# Patient Record
Sex: MALE | Race: WHITE | NOT HISPANIC OR LATINO
[De-identification: names, ages, dates, MRNs, and addresses within clinical notes are randomized per-mention and may not be internally consistent; named-entity substitution may affect disease eponyms.]

---

## 2017-08-14 ENCOUNTER — APPOINTMENT (OUTPATIENT)
Dept: OTHER | Facility: CLINIC | Age: 52
End: 2017-08-14
Payer: COMMERCIAL

## 2017-08-14 VITALS
SYSTOLIC BLOOD PRESSURE: 121 MMHG | HEART RATE: 54 BPM | DIASTOLIC BLOOD PRESSURE: 69 MMHG | WEIGHT: 213 LBS | BODY MASS INDEX: 30.49 KG/M2 | OXYGEN SATURATION: 97 % | HEIGHT: 70 IN

## 2017-08-14 PROCEDURE — 94010 BREATHING CAPACITY TEST: CPT

## 2017-08-14 PROCEDURE — 99396 PREV VISIT EST AGE 40-64: CPT

## 2017-08-14 PROCEDURE — 96150: CPT

## 2017-08-18 LAB
ALBUMIN SERPL ELPH-MCNC: 4.4 G/DL
ALP BLD-CCNC: 54 U/L
ALT SERPL-CCNC: 24 U/L
ANION GAP SERPL CALC-SCNC: 16 MMOL/L
APPEARANCE: ABNORMAL
AST SERPL-CCNC: 28 U/L
BASOPHILS # BLD AUTO: 0.01 K/UL
BASOPHILS NFR BLD AUTO: 0.2 %
BILIRUB SERPL-MCNC: 0.7 MG/DL
BILIRUBIN URINE: NEGATIVE
BLOOD URINE: NEGATIVE
BUN SERPL-MCNC: 17 MG/DL
CALCIUM SERPL-MCNC: 8.8 MG/DL
CHLORIDE SERPL-SCNC: 99 MMOL/L
CHOLEST SERPL-MCNC: 242 MG/DL
CHOLEST/HDLC SERPL: 3.2 RATIO
CO2 SERPL-SCNC: 25 MMOL/L
COLOR: ABNORMAL
CREAT SERPL-MCNC: 1.14 MG/DL
EOSINOPHIL # BLD AUTO: 0.02 K/UL
EOSINOPHIL NFR BLD AUTO: 0.4 %
GLUCOSE QUALITATIVE U: NORMAL MG/DL
GLUCOSE SERPL-MCNC: 94 MG/DL
HCT VFR BLD CALC: 43.4 %
HDLC SERPL-MCNC: 76 MG/DL
HGB BLD-MCNC: 14.6 G/DL
IMM GRANULOCYTES NFR BLD AUTO: 0.4 %
KETONES URINE: ABNORMAL
LDLC SERPL CALC-MCNC: 152 MG/DL
LEUKOCYTE ESTERASE URINE: NEGATIVE
LYMPHOCYTES # BLD AUTO: 1.56 K/UL
LYMPHOCYTES NFR BLD AUTO: 28.3 %
MAN DIFF?: NORMAL
MCHC RBC-ENTMCNC: 31.5 PG
MCHC RBC-ENTMCNC: 33.6 GM/DL
MCV RBC AUTO: 93.7 FL
MONOCYTES # BLD AUTO: 0.54 K/UL
MONOCYTES NFR BLD AUTO: 9.8 %
NEUTROPHILS # BLD AUTO: 3.37 K/UL
NEUTROPHILS NFR BLD AUTO: 60.9 %
NITRITE URINE: NEGATIVE
PH URINE: 6
PLATELET # BLD AUTO: 219 K/UL
POTASSIUM SERPL-SCNC: 4.5 MMOL/L
PROT SERPL-MCNC: 7.7 G/DL
PROTEIN URINE: NEGATIVE MG/DL
RBC # BLD: 4.63 M/UL
RBC # FLD: 12.3 %
SODIUM SERPL-SCNC: 140 MMOL/L
SPECIFIC GRAVITY URINE: 1.03
TRIGL SERPL-MCNC: 68 MG/DL
UROBILINOGEN URINE: 1 MG/DL
WBC # FLD AUTO: 5.52 K/UL

## 2018-10-11 ENCOUNTER — APPOINTMENT (OUTPATIENT)
Dept: OTHER | Facility: CLINIC | Age: 53
End: 2018-10-11
Payer: COMMERCIAL

## 2018-10-11 VITALS
DIASTOLIC BLOOD PRESSURE: 80 MMHG | WEIGHT: 208 LBS | HEIGHT: 70 IN | OXYGEN SATURATION: 98 % | SYSTOLIC BLOOD PRESSURE: 126 MMHG | HEART RATE: 50 BPM | BODY MASS INDEX: 29.78 KG/M2 | RESPIRATION RATE: 16 BRPM

## 2018-10-11 DIAGNOSIS — Z80.1 FAMILY HISTORY OF MALIGNANT NEOPLASM OF TRACHEA, BRONCHUS AND LUNG: ICD-10-CM

## 2018-10-11 DIAGNOSIS — Z82.49 FAMILY HISTORY OF ISCHEMIC HEART DISEASE AND OTHER DISEASES OF THE CIRCULATORY SYSTEM: ICD-10-CM

## 2018-10-11 PROCEDURE — 96150: CPT

## 2018-10-11 PROCEDURE — 99396 PREV VISIT EST AGE 40-64: CPT

## 2018-10-11 PROCEDURE — 94010 BREATHING CAPACITY TEST: CPT

## 2018-10-12 LAB
ALBUMIN SERPL ELPH-MCNC: 4.4 G/DL
ALP BLD-CCNC: 57 U/L
ALT SERPL-CCNC: 30 U/L
ANION GAP SERPL CALC-SCNC: 10 MMOL/L
APPEARANCE: CLEAR
AST SERPL-CCNC: 30 U/L
BACTERIA: NEGATIVE
BASOPHILS # BLD AUTO: 0.01 K/UL
BASOPHILS NFR BLD AUTO: 0.2 %
BILIRUB SERPL-MCNC: 0.6 MG/DL
BILIRUBIN URINE: NEGATIVE
BLOOD URINE: NEGATIVE
BUN SERPL-MCNC: 14 MG/DL
CALCIUM SERPL-MCNC: 9.2 MG/DL
CHLORIDE SERPL-SCNC: 102 MMOL/L
CHOLEST SERPL-MCNC: 207 MG/DL
CHOLEST/HDLC SERPL: 2.9 RATIO
CO2 SERPL-SCNC: 26 MMOL/L
COLOR: YELLOW
CREAT SERPL-MCNC: 1.15 MG/DL
EOSINOPHIL # BLD AUTO: 0.03 K/UL
EOSINOPHIL NFR BLD AUTO: 0.7 %
GLUCOSE QUALITATIVE U: NEGATIVE MG/DL
GLUCOSE SERPL-MCNC: 87 MG/DL
HCT VFR BLD CALC: 42.9 %
HDLC SERPL-MCNC: 71 MG/DL
HGB BLD-MCNC: 13.9 G/DL
IMM GRANULOCYTES NFR BLD AUTO: 0.2 %
KETONES URINE: NEGATIVE
LDLC SERPL CALC-MCNC: 117 MG/DL
LEUKOCYTE ESTERASE URINE: NEGATIVE
LYMPHOCYTES # BLD AUTO: 1.49 K/UL
LYMPHOCYTES NFR BLD AUTO: 32.5 %
MAN DIFF?: NORMAL
MCHC RBC-ENTMCNC: 31.9 PG
MCHC RBC-ENTMCNC: 32.4 GM/DL
MCV RBC AUTO: 98.4 FL
MICROSCOPIC-UA: NORMAL
MONOCYTES # BLD AUTO: 0.48 K/UL
MONOCYTES NFR BLD AUTO: 10.5 %
NEUTROPHILS # BLD AUTO: 2.56 K/UL
NEUTROPHILS NFR BLD AUTO: 55.9 %
NITRITE URINE: NEGATIVE
PH URINE: 5.5
PLATELET # BLD AUTO: 222 K/UL
POTASSIUM SERPL-SCNC: 4.7 MMOL/L
PROT SERPL-MCNC: 7 G/DL
PROTEIN URINE: NEGATIVE MG/DL
RBC # BLD: 4.36 M/UL
RBC # FLD: 12.6 %
RED BLOOD CELLS URINE: 1 /HPF
SODIUM SERPL-SCNC: 138 MMOL/L
SPECIFIC GRAVITY URINE: 1.02
SQUAMOUS EPITHELIAL CELLS: 0 /HPF
TRIGL SERPL-MCNC: 94 MG/DL
UROBILINOGEN URINE: NEGATIVE MG/DL
WBC # FLD AUTO: 4.58 K/UL
WHITE BLOOD CELLS URINE: 0 /HPF

## 2020-09-14 ENCOUNTER — OUTPATIENT (OUTPATIENT)
Dept: OUTPATIENT SERVICES | Facility: HOSPITAL | Age: 55
LOS: 1 days | End: 2020-09-14
Payer: COMMERCIAL

## 2020-09-14 ENCOUNTER — APPOINTMENT (OUTPATIENT)
Dept: OTHER | Facility: CLINIC | Age: 55
End: 2020-09-14
Payer: COMMERCIAL

## 2020-09-14 ENCOUNTER — RESULT REVIEW (OUTPATIENT)
Age: 55
End: 2020-09-14

## 2020-09-14 VITALS
DIASTOLIC BLOOD PRESSURE: 78 MMHG | RESPIRATION RATE: 16 BRPM | OXYGEN SATURATION: 99 % | SYSTOLIC BLOOD PRESSURE: 119 MMHG | HEART RATE: 59 BPM | HEIGHT: 70 IN | WEIGHT: 212 LBS | TEMPERATURE: 97.5 F | BODY MASS INDEX: 30.35 KG/M2

## 2020-09-14 DIAGNOSIS — Z04.9 ENCOUNTER FOR EXAMINATION AND OBSERVATION FOR UNSPECIFIED REASON: ICD-10-CM

## 2020-09-14 DIAGNOSIS — Z23 ENCOUNTER FOR IMMUNIZATION: ICD-10-CM

## 2020-09-14 PROCEDURE — 99396 PREV VISIT EST AGE 40-64: CPT | Mod: 25

## 2020-09-14 PROCEDURE — G0008: CPT

## 2020-09-14 PROCEDURE — 71046 X-RAY EXAM CHEST 2 VIEWS: CPT | Mod: 26

## 2020-09-14 PROCEDURE — 71046 X-RAY EXAM CHEST 2 VIEWS: CPT

## 2020-09-14 PROCEDURE — 90686 IIV4 VACC NO PRSV 0.5 ML IM: CPT

## 2020-09-15 LAB
ALBUMIN SERPL ELPH-MCNC: 4.7 G/DL
ALP BLD-CCNC: 54 U/L
ALT SERPL-CCNC: 28 U/L
ANION GAP SERPL CALC-SCNC: 11 MMOL/L
APPEARANCE: CLEAR
AST SERPL-CCNC: 32 U/L
BACTERIA: NEGATIVE
BASOPHILS # BLD AUTO: 0.02 K/UL
BASOPHILS NFR BLD AUTO: 0.4 %
BILIRUB SERPL-MCNC: 0.8 MG/DL
BILIRUBIN URINE: NEGATIVE
BLOOD URINE: NEGATIVE
BUN SERPL-MCNC: 12 MG/DL
CALCIUM SERPL-MCNC: 9.5 MG/DL
CHLORIDE SERPL-SCNC: 104 MMOL/L
CHOLEST SERPL-MCNC: 226 MG/DL
CHOLEST/HDLC SERPL: 3 RATIO
CO2 SERPL-SCNC: 25 MMOL/L
COLOR: YELLOW
CREAT SERPL-MCNC: 1.07 MG/DL
EOSINOPHIL # BLD AUTO: 0.01 K/UL
EOSINOPHIL NFR BLD AUTO: 0.2 %
GLUCOSE QUALITATIVE U: NEGATIVE
GLUCOSE SERPL-MCNC: 91 MG/DL
HCT VFR BLD CALC: 43.6 %
HDLC SERPL-MCNC: 75 MG/DL
HGB BLD-MCNC: 14.5 G/DL
HYALINE CASTS: 0 /LPF
IMM GRANULOCYTES NFR BLD AUTO: 0.2 %
KETONES URINE: NORMAL
LDLC SERPL CALC-MCNC: 137 MG/DL
LEUKOCYTE ESTERASE URINE: NEGATIVE
LYMPHOCYTES # BLD AUTO: 1.56 K/UL
LYMPHOCYTES NFR BLD AUTO: 29.2 %
MAN DIFF?: NORMAL
MCHC RBC-ENTMCNC: 32.4 PG
MCHC RBC-ENTMCNC: 33.3 GM/DL
MCV RBC AUTO: 97.3 FL
MICROSCOPIC-UA: NORMAL
MONOCYTES # BLD AUTO: 0.42 K/UL
MONOCYTES NFR BLD AUTO: 7.9 %
NEUTROPHILS # BLD AUTO: 3.33 K/UL
NEUTROPHILS NFR BLD AUTO: 62.1 %
NITRITE URINE: NEGATIVE
PH URINE: 6
PLATELET # BLD AUTO: 200 K/UL
POTASSIUM SERPL-SCNC: 4.6 MMOL/L
PROT SERPL-MCNC: 7.1 G/DL
PROTEIN URINE: NORMAL
RBC # BLD: 4.48 M/UL
RBC # FLD: 11.6 %
RED BLOOD CELLS URINE: 3 /HPF
SODIUM SERPL-SCNC: 140 MMOL/L
SPECIFIC GRAVITY URINE: 1.03
SQUAMOUS EPITHELIAL CELLS: 0 /HPF
TRIGL SERPL-MCNC: 72 MG/DL
UROBILINOGEN URINE: NORMAL
WBC # FLD AUTO: 5.35 K/UL
WHITE BLOOD CELLS URINE: 2 /HPF

## 2021-06-10 NOTE — DISCUSSION/SUMMARY
[Please See Note in Chart and Documentation in Trial DB] : Please see note in chart and documentation in Trial DB. [Patient seen for WTC Monitoring ___] : Patient was seen for WTC monitoring [unfilled] [FreeTextEntry3] : HPI: 56 yo male \par no complaint \par \par PCP: Dr. 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 [employer] \par \par PMH/PSH: denies \par Family History: has a twin with WPW \par Preventive Screening: t had colonoscopy , NL \par Allergies: NKDA \par Meds: see trial db and allscripts \par Soc Hx: 2nd marriage kids ages 6 and 10 yo, adult daughter  from former marriage works in [employer] \par Smoking Status: cigars once a week,  no cigarettes , drinks marcial few times a week\par \par Review of Systems—IAMQ reviewed with patient\par \par PE: in Trial DB \par \par Results:\par \par CXR: ordered \par \par Spirometry: not done this visit \par \par A/P: WTC annual MV \par  CBC, CMP, lipids, UA ordered \par patient stated that he had a colonoscopy 4 years ago and already schedule another one \par  flu vaccine administered

## 2021-09-14 ENCOUNTER — APPOINTMENT (OUTPATIENT)
Dept: OTHER | Facility: CLINIC | Age: 56
End: 2021-09-14
Payer: COMMERCIAL

## 2021-09-14 VITALS
RESPIRATION RATE: 17 BRPM | SYSTOLIC BLOOD PRESSURE: 132 MMHG | HEART RATE: 51 BPM | HEIGHT: 70 IN | DIASTOLIC BLOOD PRESSURE: 78 MMHG | OXYGEN SATURATION: 98 % | BODY MASS INDEX: 30.21 KG/M2 | WEIGHT: 211 LBS | TEMPERATURE: 98.2 F

## 2021-09-14 PROCEDURE — 99396 PREV VISIT EST AGE 40-64: CPT | Mod: 25

## 2021-09-14 NOTE — DISCUSSION/SUMMARY
[Patient seen for WTC Monitoring ___] : Patient was seen for WTC monitoring [unfilled] [Please See Note in Chart and Documentation in Trial DB] : Please see note in chart and documentation in Trial DB. [FreeTextEntry3] : HPI: 57 yo male \par no complaints\par  had SARS CoV 2 infection 08 28 2021\par  still have loss of taste  \par \par PCP: Dr. 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[employer] \par \par PMH/PSH: denies \par Family History: has a twin with WPW \par Preventive Screening: t had colonoscopy , NL \par Allergies: NKDA \par Meds: see trial db and allscripts \par Soc Hx: 2nd marriage kids ages 6 and 10 yo, adult daughter  from former marriage works in [employer] \par Smoking Status: cigars once a week,  no cigarettes , drinks marcial few times a week\par \par Review of Systems—IAMQ reviewed with patient\par \par PE: in Trial DB \par \par Results:\par \par CXR: 2020 \par \par Spirometry: not done this visit \par \par A/P: WTC annual MV \par  CBC, CMP, lipids, UA ordered \par \par  flu vaccine- stated that will get though [facility]

## 2021-09-15 LAB
ALBUMIN SERPL ELPH-MCNC: 4.4 G/DL
ALP BLD-CCNC: 59 U/L
ALT SERPL-CCNC: 19 U/L
ANION GAP SERPL CALC-SCNC: 11 MMOL/L
APPEARANCE: CLEAR
AST SERPL-CCNC: 25 U/L
BACTERIA: NEGATIVE
BASOPHILS # BLD AUTO: 0.02 K/UL
BASOPHILS NFR BLD AUTO: 0.4 %
BILIRUB SERPL-MCNC: 0.6 MG/DL
BILIRUBIN URINE: NEGATIVE
BLOOD URINE: NEGATIVE
BUN SERPL-MCNC: 13 MG/DL
CALCIUM SERPL-MCNC: 9.3 MG/DL
CHLORIDE SERPL-SCNC: 104 MMOL/L
CHOLEST SERPL-MCNC: 187 MG/DL
CO2 SERPL-SCNC: 25 MMOL/L
COLOR: YELLOW
CREAT SERPL-MCNC: 1.12 MG/DL
EOSINOPHIL # BLD AUTO: 0.03 K/UL
EOSINOPHIL NFR BLD AUTO: 0.6 %
GLUCOSE QUALITATIVE U: NEGATIVE
GLUCOSE SERPL-MCNC: 86 MG/DL
HCT VFR BLD CALC: 44.2 %
HDLC SERPL-MCNC: 52 MG/DL
HGB BLD-MCNC: 14.2 G/DL
HYALINE CASTS: 0 /LPF
IMM GRANULOCYTES NFR BLD AUTO: 0.2 %
KETONES URINE: NEGATIVE
LDLC SERPL CALC-MCNC: 121 MG/DL
LEUKOCYTE ESTERASE URINE: NEGATIVE
LYMPHOCYTES # BLD AUTO: 1.41 K/UL
LYMPHOCYTES NFR BLD AUTO: 29.7 %
MAN DIFF?: NORMAL
MCHC RBC-ENTMCNC: 32.1 GM/DL
MCHC RBC-ENTMCNC: 32.3 PG
MCV RBC AUTO: 100.5 FL
MICROSCOPIC-UA: NORMAL
MONOCYTES # BLD AUTO: 0.47 K/UL
MONOCYTES NFR BLD AUTO: 9.9 %
NEUTROPHILS # BLD AUTO: 2.8 K/UL
NEUTROPHILS NFR BLD AUTO: 59.2 %
NITRITE URINE: NEGATIVE
NONHDLC SERPL-MCNC: 135 MG/DL
PH URINE: 5.5
PLATELET # BLD AUTO: 228 K/UL
POTASSIUM SERPL-SCNC: 4.4 MMOL/L
PROT SERPL-MCNC: 6.9 G/DL
PROTEIN URINE: NEGATIVE
RBC # BLD: 4.4 M/UL
RBC # FLD: 11.9 %
RED BLOOD CELLS URINE: 2 /HPF
SODIUM SERPL-SCNC: 140 MMOL/L
SPECIFIC GRAVITY URINE: 1.03
SQUAMOUS EPITHELIAL CELLS: 0 /HPF
TRIGL SERPL-MCNC: 69 MG/DL
UROBILINOGEN URINE: NORMAL
WBC # FLD AUTO: 4.74 K/UL
WHITE BLOOD CELLS URINE: 1 /HPF

## 2022-09-01 ENCOUNTER — APPOINTMENT (OUTPATIENT)
Dept: OTHER | Facility: CLINIC | Age: 57
End: 2022-09-01

## 2022-09-01 VITALS
WEIGHT: 204 LBS | HEART RATE: 54 BPM | BODY MASS INDEX: 29.2 KG/M2 | TEMPERATURE: 98.5 F | SYSTOLIC BLOOD PRESSURE: 118 MMHG | OXYGEN SATURATION: 96 % | HEIGHT: 70 IN | DIASTOLIC BLOOD PRESSURE: 74 MMHG

## 2022-09-01 PROCEDURE — G0008: CPT

## 2022-09-01 PROCEDURE — 90686 IIV4 VACC NO PRSV 0.5 ML IM: CPT

## 2022-09-01 PROCEDURE — 94010 BREATHING CAPACITY TEST: CPT

## 2022-09-01 PROCEDURE — 99396 PREV VISIT EST AGE 40-64: CPT | Mod: 25

## 2022-09-01 NOTE — DISCUSSION/SUMMARY
[Patient seen for WTC Monitoring ___] : Patient was seen for WTC monitoring [unfilled] [Please See Note in Chart and Documentation in Trial DB] : Please see note in chart and documentation in Trial DB. [FreeTextEntry3] : HPI: 58 yo male \par no complaints\par  had SARS CoV 2 infection 08 28 2021\par  still have loss of taste  that persists \par \par PCP: Dr. 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[employer] \par \par PMH/PSH: denies \par Family History: has a twin with WPW \par Preventive Screening: t had colonoscopy , NL \par Allergies: NKDA \par Meds: see trial db and allscripts \par Soc Hx: 2nd marriage kids ages 6 and 10 yo, adult daughter  from former marriage works in [employer] \par Smoking Status: cigars once a week,  no cigarettes , drinks marcial few times a week\par \par Review of Systems—IAMQ reviewed with patient\par \par PE: in Trial DB \par \par Results:\par \par CXR: 2020, declined CXR  \par \par Spirometry: normal \par \par A/P: WTC annual MV \par  CBC, CMP, lipids, UA ordered \par \par  flu vaccine ordered

## 2022-09-06 LAB
ALBUMIN SERPL ELPH-MCNC: 4.6 G/DL
ALP BLD-CCNC: 65 U/L
ALT SERPL-CCNC: 23 U/L
ANION GAP SERPL CALC-SCNC: 10 MMOL/L
APPEARANCE: CLEAR
AST SERPL-CCNC: 26 U/L
BACTERIA: NEGATIVE
BASOPHILS # BLD AUTO: 0.04 K/UL
BASOPHILS NFR BLD AUTO: 0.9 %
BILIRUB SERPL-MCNC: 0.8 MG/DL
BILIRUBIN URINE: NEGATIVE
BLOOD URINE: NEGATIVE
BUN SERPL-MCNC: 15 MG/DL
CALCIUM SERPL-MCNC: 9.6 MG/DL
CHLORIDE SERPL-SCNC: 104 MMOL/L
CHOLEST SERPL-MCNC: 235 MG/DL
CO2 SERPL-SCNC: 26 MMOL/L
COLOR: YELLOW
CREAT SERPL-MCNC: 1.13 MG/DL
EGFR: 76 ML/MIN/1.73M2
EOSINOPHIL # BLD AUTO: 0.04 K/UL
EOSINOPHIL NFR BLD AUTO: 0.9 %
GLUCOSE QUALITATIVE U: NEGATIVE
GLUCOSE SERPL-MCNC: 84 MG/DL
HCT VFR BLD CALC: 45.1 %
HDLC SERPL-MCNC: 71 MG/DL
HGB BLD-MCNC: 14.7 G/DL
HYALINE CASTS: 0 /LPF
IMM GRANULOCYTES NFR BLD AUTO: 0.2 %
KETONES URINE: NEGATIVE
LDLC SERPL CALC-MCNC: 150 MG/DL
LEUKOCYTE ESTERASE URINE: NEGATIVE
LYMPHOCYTES # BLD AUTO: 1.36 K/UL
LYMPHOCYTES NFR BLD AUTO: 31.7 %
MAN DIFF?: NORMAL
MCHC RBC-ENTMCNC: 32.5 PG
MCHC RBC-ENTMCNC: 32.6 GM/DL
MCV RBC AUTO: 99.8 FL
MICROSCOPIC-UA: NORMAL
MONOCYTES # BLD AUTO: 0.43 K/UL
MONOCYTES NFR BLD AUTO: 10 %
NEUTROPHILS # BLD AUTO: 2.41 K/UL
NEUTROPHILS NFR BLD AUTO: 56.3 %
NITRITE URINE: NEGATIVE
NONHDLC SERPL-MCNC: 164 MG/DL
PH URINE: 6
PLATELET # BLD AUTO: 222 K/UL
POTASSIUM SERPL-SCNC: 4.8 MMOL/L
PROT SERPL-MCNC: 7.1 G/DL
PROTEIN URINE: NORMAL
RBC # BLD: 4.52 M/UL
RBC # FLD: 12.3 %
RED BLOOD CELLS URINE: 4 /HPF
SODIUM SERPL-SCNC: 140 MMOL/L
SPECIFIC GRAVITY URINE: 1.03
SQUAMOUS EPITHELIAL CELLS: 0 /HPF
TRIGL SERPL-MCNC: 69 MG/DL
UROBILINOGEN URINE: NORMAL
WBC # FLD AUTO: 4.29 K/UL
WHITE BLOOD CELLS URINE: 0 /HPF

## 2023-08-25 ENCOUNTER — APPOINTMENT (OUTPATIENT)
Dept: OTHER | Facility: CLINIC | Age: 58
End: 2023-08-25
Payer: COMMERCIAL

## 2023-08-25 ENCOUNTER — TRANSCRIPTION ENCOUNTER (OUTPATIENT)
Age: 58
End: 2023-08-25

## 2023-08-25 VITALS
TEMPERATURE: 98 F | WEIGHT: 204 LBS | BODY MASS INDEX: 30.21 KG/M2 | HEIGHT: 69 IN | HEART RATE: 59 BPM | SYSTOLIC BLOOD PRESSURE: 129 MMHG | RESPIRATION RATE: 17 BRPM | OXYGEN SATURATION: 96 % | DIASTOLIC BLOOD PRESSURE: 85 MMHG

## 2023-08-25 DIAGNOSIS — Z04.9 ENCOUNTER FOR EXAMINATION AND OBSERVATION FOR UNSPECIFIED REASON: ICD-10-CM

## 2023-08-25 PROCEDURE — 99396 PREV VISIT EST AGE 40-64: CPT | Mod: 25

## 2023-08-25 PROCEDURE — 94010 BREATHING CAPACITY TEST: CPT

## 2023-08-25 NOTE — DISCUSSION/SUMMARY
[Patient seen for WTC Monitoring ___] : Patient was seen for WTC monitoring [unfilled] [Please See Note in Chart and Documentation in Trial DB] : Please see note in chart and documentation in Trial DB. [FreeTextEntry3] : HPI: 59 yo male  no complaints  had SARS CoV 2 infection 08 28 2021  still have loss of taste  that persists   PCP: Dr. Reyes   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 annual MV   CBC, CMP, lipids, UA ordered

## 2023-08-25 NOTE — HEALTH RISK ASSESSMENT
[Patient reported colonoscopy was normal] : Patient reported colonoscopy was normal [ColonoscopyComments] : was told to come in 7 years for a f/up  [ColonoscopyDate] : 2016

## 2023-08-30 ENCOUNTER — NON-APPOINTMENT (OUTPATIENT)
Age: 58
End: 2023-08-30

## 2023-08-30 LAB
ALBUMIN SERPL ELPH-MCNC: 4.6 G/DL
ALP BLD-CCNC: 67 U/L
ALT SERPL-CCNC: 26 U/L
ANION GAP SERPL CALC-SCNC: 11 MMOL/L
APPEARANCE: CLEAR
AST SERPL-CCNC: 31 U/L
BACTERIA: NEGATIVE /HPF
BILIRUB SERPL-MCNC: 0.3 MG/DL
BILIRUBIN URINE: NEGATIVE
BLOOD URINE: NEGATIVE
BUN SERPL-MCNC: 12 MG/DL
CALCIUM SERPL-MCNC: 9.4 MG/DL
CAST: 0 /LPF
CHLORIDE SERPL-SCNC: 107 MMOL/L
CHOLEST SERPL-MCNC: 244 MG/DL
CO2 SERPL-SCNC: 25 MMOL/L
COLOR: NORMAL
CREAT SERPL-MCNC: 1.12 MG/DL
EGFR: 76 ML/MIN/1.73M2
EPITHELIAL CELLS: 0 /HPF
GLUCOSE QUALITATIVE U: NEGATIVE MG/DL
GLUCOSE SERPL-MCNC: 98 MG/DL
HDLC SERPL-MCNC: 86 MG/DL
KETONES URINE: ABNORMAL MG/DL
LDLC SERPL CALC-MCNC: 143 MG/DL
LEUKOCYTE ESTERASE URINE: NEGATIVE
MICROSCOPIC-UA: NORMAL
NITRITE URINE: NEGATIVE
NONHDLC SERPL-MCNC: 158 MG/DL
PH URINE: 6
POTASSIUM SERPL-SCNC: 4.6 MMOL/L
PROT SERPL-MCNC: 7.3 G/DL
PROTEIN URINE: NORMAL MG/DL
RED BLOOD CELLS URINE: 1 /HPF
SODIUM SERPL-SCNC: 143 MMOL/L
SPECIFIC GRAVITY URINE: 1.03
TRIGL SERPL-MCNC: 89 MG/DL
UROBILINOGEN URINE: 0.2 MG/DL
WHITE BLOOD CELLS URINE: 1 /HPF

## 2023-09-07 ENCOUNTER — OUTPATIENT (OUTPATIENT)
Dept: OUTPATIENT SERVICES | Facility: HOSPITAL | Age: 58
LOS: 1 days | End: 2023-09-07
Payer: COMMERCIAL

## 2023-09-07 DIAGNOSIS — Z04.9 ENCOUNTER FOR EXAMINATION AND OBSERVATION FOR UNSPECIFIED REASON: ICD-10-CM

## 2023-09-07 PROCEDURE — 71046 X-RAY EXAM CHEST 2 VIEWS: CPT | Mod: 26

## 2023-09-07 PROCEDURE — 71046 X-RAY EXAM CHEST 2 VIEWS: CPT

## 2023-09-13 ENCOUNTER — NON-APPOINTMENT (OUTPATIENT)
Age: 58
End: 2023-09-13

## 2023-09-13 DIAGNOSIS — Z12.9 ENCOUNTER FOR SCREENING FOR MALIGNANT NEOPLASM, SITE UNSPECIFIED: ICD-10-CM

## 2023-09-15 ENCOUNTER — APPOINTMENT (OUTPATIENT)
Dept: GASTROENTEROLOGY | Facility: CLINIC | Age: 58
End: 2023-09-15
Payer: COMMERCIAL

## 2023-09-15 VITALS
HEART RATE: 54 BPM | SYSTOLIC BLOOD PRESSURE: 116 MMHG | HEIGHT: 69 IN | WEIGHT: 201 LBS | BODY MASS INDEX: 29.77 KG/M2 | TEMPERATURE: 97.6 F | OXYGEN SATURATION: 97 % | DIASTOLIC BLOOD PRESSURE: 73 MMHG

## 2023-09-15 DIAGNOSIS — R10.13 EPIGASTRIC PAIN: ICD-10-CM

## 2023-09-15 DIAGNOSIS — Z12.11 ENCOUNTER FOR SCREENING FOR MALIGNANT NEOPLASM OF COLON: ICD-10-CM

## 2023-09-15 DIAGNOSIS — Z80.9 FAMILY HISTORY OF MALIGNANT NEOPLASM, UNSPECIFIED: ICD-10-CM

## 2023-09-15 PROCEDURE — 99204 OFFICE O/P NEW MOD 45 MIN: CPT

## 2024-01-09 ENCOUNTER — NON-APPOINTMENT (OUTPATIENT)
Age: 59
End: 2024-01-09

## 2024-01-23 ENCOUNTER — OUTPATIENT (OUTPATIENT)
Dept: OUTPATIENT SERVICES | Facility: HOSPITAL | Age: 59
LOS: 1 days | End: 2024-01-23
Payer: COMMERCIAL

## 2024-01-23 ENCOUNTER — RESULT REVIEW (OUTPATIENT)
Age: 59
End: 2024-01-23

## 2024-01-23 ENCOUNTER — TRANSCRIPTION ENCOUNTER (OUTPATIENT)
Age: 59
End: 2024-01-23

## 2024-01-23 ENCOUNTER — APPOINTMENT (OUTPATIENT)
Dept: GASTROENTEROLOGY | Facility: HOSPITAL | Age: 59
End: 2024-01-23

## 2024-01-23 VITALS
RESPIRATION RATE: 14 BRPM | SYSTOLIC BLOOD PRESSURE: 145 MMHG | HEART RATE: 56 BPM | WEIGHT: 203.93 LBS | TEMPERATURE: 98 F | DIASTOLIC BLOOD PRESSURE: 72 MMHG | OXYGEN SATURATION: 98 % | HEIGHT: 69 IN

## 2024-01-23 VITALS
HEART RATE: 59 BPM | OXYGEN SATURATION: 100 % | SYSTOLIC BLOOD PRESSURE: 126 MMHG | RESPIRATION RATE: 18 BRPM | DIASTOLIC BLOOD PRESSURE: 87 MMHG

## 2024-01-23 DIAGNOSIS — Z12.11 ENCOUNTER FOR SCREENING FOR MALIGNANT NEOPLASM OF COLON: ICD-10-CM

## 2024-01-23 DIAGNOSIS — R10.13 EPIGASTRIC PAIN: ICD-10-CM

## 2024-01-23 PROCEDURE — C1889: CPT

## 2024-01-23 PROCEDURE — 88312 SPECIAL STAINS GROUP 1: CPT | Mod: 26

## 2024-01-23 PROCEDURE — 88312 SPECIAL STAINS GROUP 1: CPT

## 2024-01-23 PROCEDURE — 45385 COLONOSCOPY W/LESION REMOVAL: CPT

## 2024-01-23 PROCEDURE — 88305 TISSUE EXAM BY PATHOLOGIST: CPT

## 2024-01-23 PROCEDURE — 43239 EGD BIOPSY SINGLE/MULTIPLE: CPT

## 2024-01-23 PROCEDURE — 88305 TISSUE EXAM BY PATHOLOGIST: CPT | Mod: 26

## 2024-01-23 PROCEDURE — 45385 COLONOSCOPY W/LESION REMOVAL: CPT | Mod: PT

## 2024-01-23 DEVICE — CLIP RESOLUTION 360 235CM: Type: IMPLANTABLE DEVICE | Status: FUNCTIONAL

## 2024-01-23 RX ORDER — SODIUM CHLORIDE 9 MG/ML
500 INJECTION INTRAMUSCULAR; INTRAVENOUS; SUBCUTANEOUS
Refills: 0 | Status: COMPLETED | OUTPATIENT
Start: 2024-01-23 | End: 2024-01-23

## 2024-01-23 RX ADMIN — SODIUM CHLORIDE 30 MILLILITER(S): 9 INJECTION INTRAMUSCULAR; INTRAVENOUS; SUBCUTANEOUS at 13:45

## 2024-01-23 NOTE — ASU DISCHARGE PLAN (ADULT/PEDIATRIC) - NS MD DC FALL RISK RISK
For information on Fall & Injury Prevention, visit: [email or web address] OR  [email or web address] OR  https://www.cdc.gov/steadi/patient.html

## 2024-01-23 NOTE — ASU PATIENT PROFILE, ADULT - FALL HARM RISK - UNIVERSAL INTERVENTIONS
Bed in lowest position, wheels locked, appropriate side rails in place/Call bell, personal items and telephone in reach/Instruct patient to call for assistance before getting out of bed or chair/Non-slip footwear when patient is out of bed/[place] to call system/Physically safe environment - no spills, clutter or unnecessary equipment/Purposeful Proactive Rounding/Room/bathroom lighting operational, light cord in reach

## 2024-01-23 NOTE — CHART NOTE - NSCHARTNOTEFT_GEN_A_CORE
History of Present Illness       The patient is a 58-year-old male with no significant past medical history who was referred to my office by Dr. Harsh Reyes and Dr. Diamond Rosales of [facility] for abdominal pain and dyspepsia. The patient also admits to having a family history of pancreatic cancer The patient also came to the office for evaluation for colon cancer screening. I was asked to render an opinion for consultation for the above complaints. The patient states that he is feeling uncomfortable x 2 weeks. The patient complains of abdominal pain. The patient describes the abdominal pain as an achy, intermittent midepigastric discomfort that is nonradiating in nature. The abdominal pain is unrelated to passing gas or having bowel movements. The abdominal pain is worse with meals and stress. The abdominal pain is described as mild to moderate in nature. The abdominal pain occurs at night and in the morning. The abdominal pain can occur at any time. The abdominal pain never has awakened the patient from sleep. The abdominal pain is slightly relieved with certain medication such as pump inhibitors, H2 blockers and antacids. The abdominal pain is associated with abdominal gas and bloating. The patient denies any nausea or vomiting. The patient denies any gastroesophageal reflux disease or dysphagia. The patient denies any atypical chest pain, shortness of breath or palpitations. The patient denies any diaphoresis. The patient denies any constipation or diarrhea. The patient has 1 bowel movement a day. The patient denies a change in bowel habits. The patient denies a change in caliber of stool. The patient denies having mucus discharge with the bowel movements. The patient denies any bright red blood per rectum, melena or hematemesis. The patient denies any rectal pain or rectal pruritus. The patient denies any weight loss or anorexia. The patient lost taste x 2 years secondary to COVID infection. He denies any fevers or chills. The patient denies any jaundice or pruritus. The patient denies any back pain. The blood work performed on 2023 revealed normal liver enzymes with a total bilirubin of 0.3 mg/dL, a normal alkaline phosphatase/AST/ALT of 67/31/26 U/L, respectively, no evidence of anemia with a hemoglobin/hematocrit level of 14.8/44.5, respectively, and elevated total cholesterol level of 244 mg/dL, a normal triglyceride level of 89 mg/dL. The urinalysis performed on 2023 revealed trace ketones in the urine. The patient admits to having a prior colonoscopy performed by another gastroenterologist, approximately 8 to 10 years ago. According to the patient, the colonoscopic findings revealed a normal study. The patient admits to a family history of GI problems. The patient's father had a history of pancreatic cancer.  ?  (-) smoking, (-) ETOH, (-) IVDA  ?  Physical Exam:  General Appearance: Well developed, overweight, no acute distress  ENT: nose clear, ears unremarkable  Eyes: No enteric sclera, conjunctiva clear.  Neck: Supple, without masses  Respiratory: Breath sounds equal and bilateral, no wheezing no rales or rhonchi  Cardiovascular: S1-S2 audible, no murmur, no rubs or gallops  GI: (+) BS, soft, nontender, no rebound, no guarding, no masses  Liver: liver edge palpated  Rectal: not done  Musculo-skeletal: Good motor strength, good range of motion, normal appearing extremities  Skin: Normal appearing skin, no jaundice, no rashes or nodules  Neurological: without focal motor or sensory deficits Patient is moving all extremities spontaneously and to command with normal muscle strength alert and oriented X3  Psychiatric: Good affect, not depressed, not anxious  ?  Active Problems  Encounter for cancer screening (V76.9) (Z12.9)  Encounter for immunization (V03.89) (Z23)  Observation or evaluation for suspected condition (V71.9) (Z04.9)       ?  Surgical History  History of Complete colonoscopy       ?  Family History  Family history of  : Father  Family history of lung cancer (V16.1) (Z80.1) : Father  Family history of pancreatic cancer (V16.0) (Z80.0) : Father  Family history of Avery-Parkinson-White (WPW) syndrome (V17.49) (Z82.49) : Sibling  First degree relatives alive and well : Mother, Sibling, Sister       ?  Social History  Currently working    Never a smoker  No alcohol use  No illicit drug use       ?  Current Meds  Fish Oil CAPS  Multivitamins TABS       Allergies  No Known Drug Allergies  Animal dander - Cat  Animal dander - Dog  Pollen  Ragweed pollen  Seasonal Allergies       ?  Review of Systems          Eyes:. blurred vision.    Gastrointestinal: abdominal pain and bloating.    Genitourinary:. occasional nocturia.    Musculoskeletal:. shoulder pains.    Constitutional, Eyes, ENT, Cardiovascular, Respiratory, Gastrointestinal, Genitourinary, Musculoskeletal, Integumentary, Neurological, Psychiatric, Endocrine and Heme/Lymph are otherwise negative.  ?  Vitals  Vital Signs  ?  Recorded: 36Dae2004 12:31PM  Systolic  116, RLE, Sitting  Diastolic  73, RLE, Sitting  Height  5 ft 9 in  Weight  201 lb  BMI Calculated  29.68 kg/m2  BSA Calculated  2.07 m2  Temperature  97.6 F, Temporal  Heart Rate  54  O2 Saturation  97 %, Room Air  FiO2 Flow Rate  0 L/min, Room Air       ?  Assessment  Abdominal pain, epigastric (789.06) (R10.13)  Colon cancer screening (V76.51) (Z12.11)  Dyspepsia (536.8) (R10.13)  Family history of cancer (V16.9) (Z80.9)  Abdominal Pain: The patient complains of abdominal pain. The patient is to avoid nonsteroidal anti-inflammatory drugs and aspirin. I recommend a trial of Carafate suspension 1 gram/10 cc PO 4 times a day for 3 months for the symptoms.  Dyspepsia: The patient complains of dyspeptic symptoms. The patient was advised to abide by an anti-gas (low FOD-MAP) diet. The patient was given a pamphlet for anti-gas (low FOD-MAP). The patient and I reviewed the anti-gas (low FOD-MAP) diet at length. The patient is to start on a trial of Simethicone one tablet 4 times a day p.r.n. abdominal pain and gas.  Colon Cancer screening: I recommend colonoscopy for colon cancer screening over the age of 45 to assess for colonic polyps. The patient was told of the risks and benefits of the procedure. The patient was told of the risks of perforation, emergency surgery, bleeding, infections and missed lesions. The patient is to be on a clear liquid diet the entire day prior to the procedure. The patient is to complete the entire prescribed bowel prep the day prior to the procedure as directed. The patient is told not to drive, drink alcohol, use recreational drugs, exercise, or work the day of the procedure. The patient was told of the need for an escort to accompany the patient home after the procedure. The patient is aware that the procedure may be cancelled if they fail to follow the directions. The patient agreed and will schedule for the procedure. The patient is to be n.p.o. after midnight and bowel prep was given. The patient is to return for the procedure.  Colonoscopy: I recommend a colonoscopy to assess the symptoms and for colonic polyps. The patient was told of the risks and benefits of the procedure. The patient was told of the risks of perforation, emergency surgery, bleeding, infections and missed lesions. The patient is to be on a clear liquid diet the entire day prior to the procedure. The patient is to complete the entire prescribed bowel prep the day prior to the procedure as directed. The patient is told not to drive, drink alcohol, use recreational drugs, exercise, or work the day of the procedure. The patient was told of the need for an escort to accompany the patient home after the procedure. The patient is aware that the procedure may be cancelled if they fail to follow the directions. The patient agreed and will schedule for the procedure. The patient is to be n.p.o. after midnight and bowel prep was given. The patient is to return for the procedure.  Upper Endoscopy: I recommend an upper endoscopy to assess for peptic ulcer disease versus esophagitis. The patient was told of the risks and benefits of the procedure. The patient was told of the risks of perforation, emergency surgery, bleeding, infections and missed lesions. The patient is told not to drive, drink alcohol, use recreational drugs, exercise, or work the day of the procedure. The patient was told of the need for an escort to accompany the patient home after the procedure. The patient is aware that the procedure may be cancelled if they fail to follow the directions. The patient agreed and will schedule for the procedure. The patient is to be n.p.o. after midnight. The patient is to return for the procedure.  Family History of GI Malignancy: The patient admits to a family history of GI problems. The patient's father had a history of pancreatic cancer.  Prior Endoscopic Procedures: The patient had a prior colonoscopy performed by another gastroenterologist. I will try to obtain the prior colonoscopy reports. The patient is to sign a record release to obtain those records.  Follow-up: The patient is to follow-up in the office in 4 weeks to reassess the symptoms. The patient was told to call the office if any further problems.          ?  Plan  Abdominal pain, epigastric  Upper GI Endoscopy; Status:Active; Requested for:[id number];  Colon cancer screening  Start: Clenpiq 10-3.5-12 MG-GM -GM/175ML Oral Solution; TAKE 175 ML Twice daily  Colonoscopy Screening; Status:Hold For - Scheduling; Requested for:[id number];  Dyspepsia  Start: Simethicone 125 MG Oral Tablet Chewable; CHEW AND SWALLOW 1 TABLET 4  TIMES DAILY, AFTER MEALS AND AT BEDTIME  Start: Sucralfate 1 GM/10ML Oral Suspension; TAKE 2 TEASPOONSFUL BY MOUTH 4  TIMES A DAY

## 2024-01-25 LAB — SURGICAL PATHOLOGY STUDY: SIGNIFICANT CHANGE UP

## 2024-01-26 PROBLEM — Z78.9 OTHER SPECIFIED HEALTH STATUS: Chronic | Status: ACTIVE | Noted: 2024-01-23

## 2024-01-31 ENCOUNTER — APPOINTMENT (OUTPATIENT)
Dept: GASTROENTEROLOGY | Facility: CLINIC | Age: 59
End: 2024-01-31
Payer: COMMERCIAL

## 2024-01-31 VITALS
WEIGHT: 211 LBS | BODY MASS INDEX: 31.25 KG/M2 | OXYGEN SATURATION: 99 % | HEART RATE: 47 BPM | TEMPERATURE: 97.7 F | SYSTOLIC BLOOD PRESSURE: 137 MMHG | HEIGHT: 69 IN | DIASTOLIC BLOOD PRESSURE: 82 MMHG

## 2024-01-31 DIAGNOSIS — K21.00 GASTRO-ESOPHAGEAL REFLUX DISEASE WITH ESOPHAGITIS, WITHOUT BLEEDING: ICD-10-CM

## 2024-01-31 DIAGNOSIS — K57.30 DIVERTICULOSIS OF LARGE INTESTINE W/OUT PERFORATION OR ABSCESS W/OUT BLEEDING: ICD-10-CM

## 2024-01-31 DIAGNOSIS — D12.3 BENIGN NEOPLASM OF TRANSVERSE COLON: ICD-10-CM

## 2024-01-31 PROCEDURE — 99214 OFFICE O/P EST MOD 30 MIN: CPT

## 2024-01-31 NOTE — HISTORY OF PRESENT ILLNESS
[de-identified] : The upper endoscopy performed at the [facility] [place] GI endoscopy suite on January 23, 2024 revealed a small hiatal hernia and mild diffuse gastritis. The gastric pathology performed on January 23, 2024 revealed esophageal mucosa with squamous esophageal mucosa with mild reflux changes with no columnar mucosa present in the submitted tissue with a PASF stain that is negative for fungal microorganisms (esophagus), gastric antral mucosa with moderate chronic active antral gastritis with lymphoid aggregates that was positive for Helicobacter pylori (antrum), gastric body mucosa with gastric oxyntic mucosa with chronic gastritis and reactive changes that was positive for Helicobacter pylori (body of the stomach) and duodenal mucosa with preserved villous architecture and no significant histopathologic changes that was negative for increased intraepithelial lymphocytes (duodenum).  [FreeTextEntry1] : The colonoscopy to the cecum performed at the [facility] [place] GI endoscopy suite on January 23, 2024revealed a large hepatic flexure polyp and mild left sided diverticulosis.  The colonic polyp was snared and removed with saline pillow and endoclip was placed.  There was no bleeding post polypectomy. There were no other polyps, masses, AVMs or colitis noted.  The colonic pathology performed on January 23, 2024 revealed a tubular adenoma (hepatic flexure polyp).

## 2024-01-31 NOTE — ASSESSMENT
[FreeTextEntry1] : GERD: The patient was advised to avoid late-night meals and dietary indiscretions.  The patient was advised to avoid fried and fatty foods.  The patient was advised to abide by an anti-GERD diet. The patient was given a pamphlet for anti-GERD.  The patient and I reviewed the anti-GERD diet at length. I recommend a trial of Pantoprazole 40 mg once a day x 3 months for the symptoms. Hiatal Hernia:  The patient has a history of a hiatal hernia noted on prior upper endoscopy. The patient was advised to avoid late-night meals and dietary indiscretions.  The patient was advised to avoid fried and fatty foods.  The patient was advised to abide by an anti-GERD diet. The patient was given a pamphlet for anti-GERD.  The patient and I reviewed the anti-GERD diet at length. I recommend a trial of Pantoprazole 40 mg once a day for 3 months for the symptoms. Gastritis: The patient has a history of gastritis noted on prior upper endoscopy. The patient is to avoid nonsteroidal anti-inflammatory drugs and aspirin. I recommend a trial of pantoprazole 40 mg once a day for 3 months for the symptoms.  H. pylori Gastritis: The patient was found to have H. pylori gastritis on recent upper endoscopy.  I recommend a trial of Clarithromycin 500 mg 2 times a day, Amoxicillin 500mg 2 tablets twice a day and Omeprazole 40 mg twice a day for 14 days for H. pylori eradication.  The patient is to then start a trial of Pantoprazole 40 mg once a day x 1 month after completing antibiotic treatment.  The patient is to stop Pantoprazole x 2 weeks prior to returning to the office.  The patient is to return to the office without eating or drinking anything x 2 hours prior to the scheduled appointment.  The patient is to return to the office in 2 months for an H. pylori breath test to assess for eradication of the bacteria.  Diverticulosis: I recommend a high-fiber diet and avoid seeds. The patient is to consider a trial of Metamucil once a day for fiber supplementation. The patient is to also consider a trial of a probiotic such as Align once a day. The patient and I discussed the potential risk of diverticulitis and diverticular bleeding secondary to diverticular disease. The patient is aware of the importance of follow-up if these complications arise. Colonic Polyp: The patient was found to have colonic polyps on prior colonoscopy.  I recommend a repeat colonoscopy in 5 years to reassess for colonic polyps pending patient's health unless symptomatic.  The patient agreed and will follow up for the procedure.  Family History of GI Malignancy: The patient admits to a family history of GI problems. The patient's father had a history of pancreatic cancer. Prior Endoscopic Procedures: The patient had a prior colonoscopy performed by another gastroenterologist. I will try to obtain the prior colonoscopy reports. The patient is to sign a record release to obtain those records. Follow-up: The patient is to follow-up in the office in 4 weeks to reassess the symptoms. The patient was told to call the office if any further problems.

## 2024-02-02 ENCOUNTER — NON-APPOINTMENT (OUTPATIENT)
Age: 59
End: 2024-02-02

## 2024-02-05 ENCOUNTER — NON-APPOINTMENT (OUTPATIENT)
Age: 59
End: 2024-02-05

## 2024-02-29 ENCOUNTER — RX RENEWAL (OUTPATIENT)
Age: 59
End: 2024-02-29

## 2024-04-24 ENCOUNTER — APPOINTMENT (OUTPATIENT)
Dept: GASTROENTEROLOGY | Facility: CLINIC | Age: 59
End: 2024-04-24
Payer: COMMERCIAL

## 2024-04-24 VITALS
WEIGHT: 208 LBS | DIASTOLIC BLOOD PRESSURE: 83 MMHG | TEMPERATURE: 97.2 F | SYSTOLIC BLOOD PRESSURE: 134 MMHG | HEART RATE: 49 BPM | OXYGEN SATURATION: 98 % | BODY MASS INDEX: 30.81 KG/M2 | HEIGHT: 69 IN

## 2024-04-24 DIAGNOSIS — K21.9 GASTRO-ESOPHAGEAL REFLUX DISEASE W/OUT ESOPHAGITIS: ICD-10-CM

## 2024-04-24 DIAGNOSIS — K44.9 DIAPHRAGMATIC HERNIA W/OUT OBSTRUCTION OR GANGRENE: ICD-10-CM

## 2024-04-24 DIAGNOSIS — A04.8 OTHER SPECIFIED BACTERIAL INTESTINAL INFECTIONS: ICD-10-CM

## 2024-04-24 DIAGNOSIS — Z86.010 PERSONAL HISTORY OF COLONIC POLYPS: ICD-10-CM

## 2024-04-24 DIAGNOSIS — K29.30 CHRONIC SUPERFICIAL GASTRITIS W/OUT BLEEDING: ICD-10-CM

## 2024-04-24 PROCEDURE — 99213 OFFICE O/P EST LOW 20 MIN: CPT

## 2024-04-24 RX ORDER — CLARITHROMYCIN 500 MG/1
500 TABLET, FILM COATED ORAL TWICE DAILY
Qty: 28 | Refills: 0 | Status: COMPLETED | COMMUNITY
Start: 2024-01-31 | End: 2024-04-24

## 2024-04-24 RX ORDER — SODIUM PICOSULFATE, MAGNESIUM OXIDE, AND ANHYDROUS CITRIC ACID 12; 3.5; 1 G/175ML; G/175ML; MG/175ML
10-3.5-12 MG-GM LIQUID ORAL TWICE DAILY
Qty: 2 | Refills: 0 | Status: COMPLETED | COMMUNITY
Start: 2023-09-15 | End: 2024-04-24

## 2024-04-24 RX ORDER — SUCRALFATE 1 G/10ML
1 SUSPENSION ORAL
Qty: 1200 | Refills: 1 | Status: COMPLETED | COMMUNITY
Start: 2023-09-15 | End: 2024-04-24

## 2024-04-24 RX ORDER — PANTOPRAZOLE 40 MG/1
40 TABLET, DELAYED RELEASE ORAL
Qty: 30 | Refills: 3 | Status: ACTIVE | COMMUNITY
Start: 2024-04-24 | End: 1900-01-01

## 2024-04-24 RX ORDER — AMOXICILLIN 500 MG/1
500 CAPSULE ORAL TWICE DAILY
Qty: 56 | Refills: 0 | Status: COMPLETED | COMMUNITY
Start: 2024-01-31 | End: 2024-04-24

## 2024-04-24 RX ORDER — POLYETHYLENE GLYCOL 3350 AND ELECTROLYTES WITH LEMON FLAVOR 236; 22.74; 6.74; 5.86; 2.97 G/4L; G/4L; G/4L; G/4L; G/4L
236 POWDER, FOR SOLUTION ORAL
Qty: 1 | Refills: 0 | Status: COMPLETED | COMMUNITY
Start: 2024-01-19 | End: 2024-04-24

## 2024-04-24 RX ORDER — SIMETHICONE 125 MG/1
125 TABLET, CHEWABLE ORAL
Qty: 120 | Refills: 3 | Status: COMPLETED | COMMUNITY
Start: 2023-09-15 | End: 2024-04-24

## 2024-04-24 RX ORDER — OMEPRAZOLE 40 MG/1
40 CAPSULE, DELAYED RELEASE ORAL TWICE DAILY
Qty: 28 | Refills: 0 | Status: COMPLETED | COMMUNITY
Start: 2024-01-31 | End: 2024-04-24

## 2024-04-24 NOTE — ASSESSMENT
[FreeTextEntry1] : GERD: The patient was advised to avoid late-night meals and dietary indiscretions.  The patient was advised to avoid fried and fatty foods.  The patient was advised to abide by an anti-GERD diet. The patient was given a pamphlet for anti-GERD.  The patient and I reviewed the anti-GERD diet at length. I recommend a trial of Pantoprazole 40 mg once a day x 3 months for the symptoms. Hiatal Hernia: The patient has a history of a hiatal hernia noted on prior upper endoscopy. The patient was advised to avoid late-night meals and dietary indiscretions. The patient was advised to avoid fried and fatty foods. The patient was advised to abide by an anti-GERD diet. The patient was given a pamphlet for anti-GERD. The patient and I reviewed the anti-GERD diet at length. I recommend a trial of Pantoprazole 40 mg once a day for 3 months for the symptoms. Gastritis: The patient has a history of gastritis noted on prior upper endoscopy. The patient is to avoid nonsteroidal anti-inflammatory drugs and aspirin. I recommend a trial of pantoprazole 40 mg once a day for 3 months for the symptoms. H. pylori Gastritis: The patient was found to have H. pylori gastritis on prior upper endoscopy. The patient completed a trial of Clarithromycin 500 mg 2 times a day, Amoxicillin 500mg 2 tablets twice a day and Omeprazole 40 mg twice a day for 14 days for H. pylori eradication. I recommend an H. pylori breath test to assess for eradication of the bacteria. Diverticulosis: I recommend a high-fiber diet and avoid seeds. The patient is to consider a trial of Metamucil once a day for fiber supplementation. The patient is to also consider a trial of a probiotic such as Align once a day. The patient and I discussed the potential risk of diverticulitis and diverticular bleeding secondary to diverticular disease. The patient is aware of the importance of follow-up if these complications arise. History of Colonic Polyp: The patient was found to have colonic polyps on prior colonoscopy. I recommend a repeat colonoscopy in 5 years to reassess for colonic polyps pending patient's health unless symptomatic. The patient agreed and will follow up for the procedure. Family History of GI Malignancy: The patient admits to a family history of GI problems. The patient's father had a history of pancreatic cancer. Prior Endoscopic Procedures: The patient had a prior colonoscopy performed by another gastroenterologist. I will try to obtain the prior colonoscopy reports. The patient is to sign a record release to obtain those records. Follow-up: The patient is to follow-up in the office in 6 months to reassess the symptoms. The patient was told to call the office if any further problems.

## 2024-04-24 NOTE — HISTORY OF PRESENT ILLNESS
[de-identified] : The upper endoscopy performed at the [facility] endoscopy suite on January 23, 2024 revealed a small hiatal hernia and mild diffuse gastritis. The gastric pathology performed on January 23, 2024 revealed esophageal mucosa with squamous esophageal mucosa with mild reflux changes with no columnar mucosa present in the submitted tissue with a PASF stain that is negative for fungal microorganisms (esophagus), gastric antral mucosa with moderate chronic active antral gastritis with lymphoid aggregates that was positive for Helicobacter pylori (antrum), gastric body mucosa with gastric oxyntic mucosa with chronic gastritis and reactive changes that was positive for Helicobacter pylori (body of the stomach) and duodenal mucosa with preserved villous architecture and no significant histopathologic changes that was negative for increased intraepithelial lymphocytes (duodenum).  [FreeTextEntry1] : The colonoscopy to the cecum performed at the [facility] endoscopy suite on January 23, 2024 revealed a large hepatic flexure polyp and mild left sided diverticulosis.  The colonic polyp was snared and removed with saline pillow and endoclip was placed.  There was no bleeding post polypectomy. There were no other polyps, masses, AVMs or colitis noted.  The colonic pathology performed on January 23, 2024 revealed a tubular adenoma (hepatic flexure polyp).

## 2024-04-25 ENCOUNTER — NON-APPOINTMENT (OUTPATIENT)
Age: 59
End: 2024-04-25

## 2024-04-25 LAB — UREA BREATH TEST QL: NEGATIVE

## 2024-04-29 ENCOUNTER — NON-APPOINTMENT (OUTPATIENT)
Age: 59
End: 2024-04-29

## 2024-08-19 ENCOUNTER — RX RENEWAL (OUTPATIENT)
Age: 59
End: 2024-08-19

## 2024-08-20 ENCOUNTER — APPOINTMENT (OUTPATIENT)
Dept: OTHER | Facility: CLINIC | Age: 59
End: 2024-08-20
Payer: COMMERCIAL

## 2024-08-20 DIAGNOSIS — Z04.9 ENCOUNTER FOR EXAMINATION AND OBSERVATION FOR UNSPECIFIED REASON: ICD-10-CM

## 2024-08-20 PROCEDURE — 99396 PREV VISIT EST AGE 40-64: CPT | Mod: 25

## 2024-08-20 PROCEDURE — 94010 BREATHING CAPACITY TEST: CPT

## 2024-08-20 NOTE — DISCUSSION/SUMMARY
[Please See Note in Chart and Documentation in Trial DB] : Please see note in chart and documentation in Trial DB. 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

## 2024-08-20 NOTE — HEALTH RISK ASSESSMENT
[Patient reported colonoscopy was abnormal] : Patient reported colonoscopy was abnormal [ColonoscopyDate] : 2024 [ColonoscopyComments] : 10 mm TA, due in 3 years

## 2024-08-21 LAB
ALBUMIN SERPL ELPH-MCNC: 4.5 G/DL
ALP BLD-CCNC: 61 U/L
ALT SERPL-CCNC: 23 U/L
ANION GAP SERPL CALC-SCNC: 10 MMOL/L
APPEARANCE: CLEAR
AST SERPL-CCNC: 28 U/L
BACTERIA: NEGATIVE /HPF
BILIRUB SERPL-MCNC: 0.8 MG/DL
BILIRUBIN URINE: NEGATIVE
BLOOD URINE: NEGATIVE
BUN SERPL-MCNC: 13 MG/DL
CALCIUM SERPL-MCNC: 9.1 MG/DL
CAST: 1 /LPF
CHLORIDE SERPL-SCNC: 102 MMOL/L
CHOLEST SERPL-MCNC: 239 MG/DL
CO2 SERPL-SCNC: 27 MMOL/L
COLOR: YELLOW
CREAT SERPL-MCNC: 1.13 MG/DL
EGFR: 75 ML/MIN/1.73M2
EPITHELIAL CELLS: 0 /HPF
GLUCOSE QUALITATIVE U: NEGATIVE MG/DL
GLUCOSE SERPL-MCNC: 89 MG/DL
HCT VFR BLD CALC: 43 %
HDLC SERPL-MCNC: 74 MG/DL
HGB BLD-MCNC: 14 G/DL
KETONES URINE: NEGATIVE MG/DL
LDLC SERPL CALC-MCNC: 151 MG/DL
LEUKOCYTE ESTERASE URINE: NEGATIVE
MCHC RBC-ENTMCNC: 32.4 PG
MCHC RBC-ENTMCNC: 32.6 GM/DL
MCV RBC AUTO: 99.5 FL
MICROSCOPIC-UA: NORMAL
NITRITE URINE: NEGATIVE
NONHDLC SERPL-MCNC: 165 MG/DL
PH URINE: 7
PLATELET # BLD AUTO: 201 K/UL
POTASSIUM SERPL-SCNC: 4.4 MMOL/L
PROT SERPL-MCNC: 7 G/DL
PROTEIN URINE: NEGATIVE MG/DL
RBC # BLD: 4.32 M/UL
RBC # FLD: 11.9 %
RED BLOOD CELLS URINE: 2 /HPF
SODIUM SERPL-SCNC: 139 MMOL/L
SPECIFIC GRAVITY URINE: 1.02
TRIGL SERPL-MCNC: 85 MG/DL
UROBILINOGEN URINE: 1 MG/DL
WBC # FLD AUTO: 5.58 K/UL
WHITE BLOOD CELLS URINE: 0 /HPF

## 2024-10-25 ENCOUNTER — NON-APPOINTMENT (OUTPATIENT)
Age: 59
End: 2024-10-25

## 2024-10-25 ENCOUNTER — APPOINTMENT (OUTPATIENT)
Dept: GASTROENTEROLOGY | Facility: CLINIC | Age: 59
End: 2024-10-25
Payer: COMMERCIAL

## 2024-10-25 VITALS
HEART RATE: 50 BPM | DIASTOLIC BLOOD PRESSURE: 99 MMHG | OXYGEN SATURATION: 98 % | SYSTOLIC BLOOD PRESSURE: 153 MMHG | WEIGHT: 208 LBS | BODY MASS INDEX: 30.81 KG/M2 | HEIGHT: 69 IN | TEMPERATURE: 97 F

## 2024-10-25 DIAGNOSIS — K44.9 DIAPHRAGMATIC HERNIA W/OUT OBSTRUCTION OR GANGRENE: ICD-10-CM

## 2024-10-25 DIAGNOSIS — Z86.19 PERSONAL HISTORY OF OTHER INFECTIOUS AND PARASITIC DISEASES: ICD-10-CM

## 2024-10-25 DIAGNOSIS — Z86.0100 PERSONAL HISTORY OF COLON POLYPS, UNSPECIFIED: ICD-10-CM

## 2024-10-25 DIAGNOSIS — K21.9 GASTRO-ESOPHAGEAL REFLUX DISEASE W/OUT ESOPHAGITIS: ICD-10-CM

## 2024-10-25 DIAGNOSIS — Z80.9 FAMILY HISTORY OF MALIGNANT NEOPLASM, UNSPECIFIED: ICD-10-CM

## 2024-10-25 DIAGNOSIS — K21.00 GASTRO-ESOPHAGEAL REFLUX DISEASE WITH ESOPHAGITIS, WITHOUT BLEEDING: ICD-10-CM

## 2024-10-25 PROCEDURE — 99203 OFFICE O/P NEW LOW 30 MIN: CPT

## 2024-10-25 RX ORDER — FAMOTIDINE 40 MG/1
40 TABLET, FILM COATED ORAL
Qty: 60 | Refills: 3 | Status: ACTIVE | COMMUNITY
Start: 2024-10-25 | End: 1900-01-01

## 2025-01-29 ENCOUNTER — NON-APPOINTMENT (OUTPATIENT)
Age: 60
End: 2025-01-29

## 2025-04-25 ENCOUNTER — APPOINTMENT (OUTPATIENT)
Dept: GASTROENTEROLOGY | Facility: CLINIC | Age: 60
End: 2025-04-25
Payer: COMMERCIAL

## 2025-04-25 ENCOUNTER — NON-APPOINTMENT (OUTPATIENT)
Age: 60
End: 2025-04-25

## 2025-04-25 VITALS
HEART RATE: 69 BPM | BODY MASS INDEX: 28.44 KG/M2 | DIASTOLIC BLOOD PRESSURE: 75 MMHG | TEMPERATURE: 97.2 F | SYSTOLIC BLOOD PRESSURE: 111 MMHG | WEIGHT: 192 LBS | HEIGHT: 69 IN | OXYGEN SATURATION: 99 %

## 2025-04-25 DIAGNOSIS — K29.30 CHRONIC SUPERFICIAL GASTRITIS W/OUT BLEEDING: ICD-10-CM

## 2025-04-25 DIAGNOSIS — R10.13 EPIGASTRIC PAIN: ICD-10-CM

## 2025-04-25 DIAGNOSIS — K57.30 DIVERTICULOSIS OF LARGE INTESTINE W/OUT PERFORATION OR ABSCESS W/OUT BLEEDING: ICD-10-CM

## 2025-04-25 DIAGNOSIS — K44.9 DIAPHRAGMATIC HERNIA W/OUT OBSTRUCTION OR GANGRENE: ICD-10-CM

## 2025-04-25 DIAGNOSIS — Z86.0100 PERSONAL HISTORY OF COLON POLYPS, UNSPECIFIED: ICD-10-CM

## 2025-04-25 DIAGNOSIS — K21.9 GASTRO-ESOPHAGEAL REFLUX DISEASE W/OUT ESOPHAGITIS: ICD-10-CM

## 2025-04-25 DIAGNOSIS — Z80.9 FAMILY HISTORY OF MALIGNANT NEOPLASM, UNSPECIFIED: ICD-10-CM

## 2025-04-25 DIAGNOSIS — A04.8 OTHER SPECIFIED BACTERIAL INTESTINAL INFECTIONS: ICD-10-CM

## 2025-04-25 DIAGNOSIS — K21.00 GASTRO-ESOPHAGEAL REFLUX DISEASE WITH ESOPHAGITIS, WITHOUT BLEEDING: ICD-10-CM

## 2025-04-25 PROCEDURE — 99214 OFFICE O/P EST MOD 30 MIN: CPT

## 2025-04-25 RX ORDER — FAMOTIDINE 40 MG/1
40 TABLET, FILM COATED ORAL
Qty: 60 | Refills: 3 | Status: ACTIVE | COMMUNITY
Start: 2025-04-25 | End: 1900-01-01

## 2025-04-25 RX ORDER — SIMETHICONE 180 MG
180 CAPSULE ORAL 4 TIMES DAILY
Qty: 120 | Refills: 3 | Status: ACTIVE | COMMUNITY
Start: 2025-04-25 | End: 1900-01-01

## 2025-04-28 ENCOUNTER — APPOINTMENT (OUTPATIENT)
Dept: ULTRASOUND IMAGING | Facility: CLINIC | Age: 60
End: 2025-04-28
Payer: COMMERCIAL

## 2025-04-28 PROCEDURE — 76700 US EXAM ABDOM COMPLETE: CPT

## 2025-04-29 ENCOUNTER — NON-APPOINTMENT (OUTPATIENT)
Age: 60
End: 2025-04-29

## 2025-08-26 ENCOUNTER — APPOINTMENT (OUTPATIENT)
Dept: OTHER | Facility: CLINIC | Age: 60
End: 2025-08-26
Payer: COMMERCIAL

## 2025-08-26 ENCOUNTER — RESULT REVIEW (OUTPATIENT)
Age: 60
End: 2025-08-26

## 2025-08-26 ENCOUNTER — APPOINTMENT (OUTPATIENT)
Dept: RADIOLOGY | Facility: CLINIC | Age: 60
End: 2025-08-26
Payer: COMMERCIAL

## 2025-08-26 VITALS
RESPIRATION RATE: 16 BRPM | DIASTOLIC BLOOD PRESSURE: 81 MMHG | BODY MASS INDEX: 28.44 KG/M2 | WEIGHT: 192 LBS | HEART RATE: 58 BPM | OXYGEN SATURATION: 98 % | SYSTOLIC BLOOD PRESSURE: 134 MMHG | HEIGHT: 69 IN | TEMPERATURE: 98.3 F

## 2025-08-26 DIAGNOSIS — Z04.9 ENCOUNTER FOR EXAMINATION AND OBSERVATION FOR UNSPECIFIED REASON: ICD-10-CM

## 2025-08-26 PROCEDURE — 94010 BREATHING CAPACITY TEST: CPT

## 2025-08-26 PROCEDURE — 71046 X-RAY EXAM CHEST 2 VIEWS: CPT

## 2025-08-26 PROCEDURE — 99396 PREV VISIT EST AGE 40-64: CPT | Mod: 25

## 2025-08-28 LAB
ALBUMIN SERPL ELPH-MCNC: 4.4 G/DL
ALP BLD-CCNC: 58 U/L
ALT SERPL-CCNC: 31 U/L
ANION GAP SERPL CALC-SCNC: 11 MMOL/L
APPEARANCE: CLEAR
AST SERPL-CCNC: 31 U/L
BACTERIA: NEGATIVE /HPF
BASOPHILS # BLD AUTO: 0.02 K/UL
BASOPHILS NFR BLD AUTO: 0.4 %
BILIRUB SERPL-MCNC: 0.6 MG/DL
BILIRUBIN URINE: NEGATIVE
BLOOD URINE: NEGATIVE
BUN SERPL-MCNC: 11 MG/DL
CALCIUM SERPL-MCNC: 9 MG/DL
CAST: 0 /LPF
CHLORIDE SERPL-SCNC: 102 MMOL/L
CHOLEST SERPL-MCNC: 197 MG/DL
CO2 SERPL-SCNC: 24 MMOL/L
COLOR: YELLOW
CREAT SERPL-MCNC: 1.02 MG/DL
EGFRCR SERPLBLD CKD-EPI 2021: 84 ML/MIN/1.73M2
EOSINOPHIL # BLD AUTO: 0.01 K/UL
EOSINOPHIL NFR BLD AUTO: 0.2 %
EPITHELIAL CELLS: 0 /HPF
GLUCOSE QUALITATIVE U: NEGATIVE MG/DL
GLUCOSE SERPL-MCNC: 83 MG/DL
HCT VFR BLD CALC: 41.6 %
HDLC SERPL-MCNC: 72 MG/DL
HGB BLD-MCNC: 14.1 G/DL
IMM GRANULOCYTES NFR BLD AUTO: 0.2 %
KETONES URINE: NEGATIVE MG/DL
LDLC SERPL-MCNC: 115 MG/DL
LEUKOCYTE ESTERASE URINE: NEGATIVE
LYMPHOCYTES # BLD AUTO: 1.46 K/UL
LYMPHOCYTES NFR BLD AUTO: 28.3 %
MAN DIFF?: NORMAL
MCHC RBC-ENTMCNC: 32.9 PG
MCHC RBC-ENTMCNC: 33.9 G/DL
MCV RBC AUTO: 97.2 FL
MICROSCOPIC-UA: NORMAL
MONOCYTES # BLD AUTO: 0.42 K/UL
MONOCYTES NFR BLD AUTO: 8.2 %
NEUTROPHILS # BLD AUTO: 3.23 K/UL
NEUTROPHILS NFR BLD AUTO: 62.7 %
NITRITE URINE: NEGATIVE
NONHDLC SERPL-MCNC: 124 MG/DL
PH URINE: 6
PLATELET # BLD AUTO: 189 K/UL
POTASSIUM SERPL-SCNC: 4.2 MMOL/L
PROT SERPL-MCNC: 6.9 G/DL
PROTEIN URINE: NEGATIVE MG/DL
RBC # BLD: 4.28 M/UL
RBC # FLD: 11.9 %
RED BLOOD CELLS URINE: 2 /HPF
SODIUM SERPL-SCNC: 138 MMOL/L
SPECIFIC GRAVITY URINE: 1.02
TRIGL SERPL-MCNC: 47 MG/DL
UROBILINOGEN URINE: 0.2 MG/DL
WBC # FLD AUTO: 5.15 K/UL
WHITE BLOOD CELLS URINE: 1 /HPF

## (undated) DEVICE — SOL INJ NS 0.9% 500ML 1-PORT

## (undated) DEVICE — SENSOR O2 FINGER ADULT

## (undated) DEVICE — ADAPTER ENDO CHNL SINGLE USE

## (undated) DEVICE — TUBING MEDI-VAC W MAXIGRIP CONNECTORS 1/4"X6'

## (undated) DEVICE — PLATE NESSY 170

## (undated) DEVICE — LIFTER SYR EVERLIFT AGENT SUBMUCOSAL 5ML

## (undated) DEVICE — LUBRICATING JELLY ONESHOT 1.25OZ

## (undated) DEVICE — DRSG CURITY GAUZE SPONGE 4 X 4" 12-PLY

## (undated) DEVICE — FORMALIN CUPS 10% BUFFERED

## (undated) DEVICE — FORCEP BIOPSY 2.5MM DISP

## (undated) DEVICE — CATH ELCTR GLIDE PRB 7FR

## (undated) DEVICE — FORCEP RADIAL JAW 4 W NDL 2.2MM 2.8MM 240CM ORANGE DISP

## (undated) DEVICE — SOLIDIFIER ISOLYZER 2000 CC

## (undated) DEVICE — STERIS DEFENDO 3-PIECE KIT (AIR/WATER, SUCTION & BIOPSY VALVES)

## (undated) DEVICE — RETRIEVER ROTH NET PLATINUM-UNIVERSAL

## (undated) DEVICE — KIT ENDO PROCEDURE CUST W/VLV

## (undated) DEVICE — TUBING CANNULA SALTER LABS NASAL ADULT 7FT

## (undated) DEVICE — NDL INJ SCLERO INTERJECT 23G

## (undated) DEVICE — CLAMP BX HOT RAD JAW 3

## (undated) DEVICE — SYR LUER LOK 50CC

## (undated) DEVICE — SNARE CAPTIVATOR II RND STIFF 15MM

## (undated) DEVICE — VENODYNE/SCD SLEEVE CALF MEDIUM

## (undated) DEVICE — MASK O2 MICROSTREAM SAMPLE LINE MED/LRG 10FT

## (undated) DEVICE — TUBING IV SET GRAVITY 3Y 100" MACRO

## (undated) DEVICE — SNARE LOOP POLY DISP 30MM LOOP

## (undated) DEVICE — VALVE ENDO SURESEAL II 0-5FR

## (undated) DEVICE — BITE BLOCK ADULT 20 X 27MM (GREEN)